# Patient Record
Sex: FEMALE | Race: WHITE
[De-identification: names, ages, dates, MRNs, and addresses within clinical notes are randomized per-mention and may not be internally consistent; named-entity substitution may affect disease eponyms.]

---

## 2017-01-25 ENCOUNTER — HOSPITAL ENCOUNTER (EMERGENCY)
Dept: HOSPITAL 74 - JERFT | Age: 37
Discharge: HOME | End: 2017-01-25
Payer: COMMERCIAL

## 2017-01-25 VITALS — BODY MASS INDEX: 22.6 KG/M2

## 2017-01-25 VITALS — TEMPERATURE: 97.9 F | HEART RATE: 75 BPM | SYSTOLIC BLOOD PRESSURE: 120 MMHG | DIASTOLIC BLOOD PRESSURE: 67 MMHG
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
## 2017-01-25 DIAGNOSIS — Z77.21: Primary | ICD-10-CM
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
## 2017-01-25 NOTE — PDOC
Post Exposure HPI





- General


Chief Complaint: Blood/Body Fluid Exposure SJR


Stated Complaint: STUCK BY NEEDLE


Time Seen by Provider: 01/25/17 16:53


History Source: Patient


Exam Limitations: No Limitations





- History of Present Illness


Initial Comments: 





01/25/17 17:25


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 

of needle that had been used, #10 are with full and will bounce back impaling 

her left index finger proximal digit. Patient states cleaned with alcohol and 

soap and water, 


Timing: this morning





Past History





- Travel


Traveled outside of the country in the last 30 days: Yes


Close contact w/someone who was outside of country & ill: Yes





- Past Medical History


Allergies/Adverse Reactions: 


Allergies





Penicillins Allergy (Verified 01/25/17 16:10)


 








Home Medications: 


Ambulatory Orders





NK [No Known Home Medication]  01/25/17 








General: Yes: no pertinent history





- Social History


Smoking Status: Never smoked





**Review of Systems





- Review of Systems


Able to Perform ROS?: Yes


Is the patient limited English proficient: Yes


Constitutional: Yes: Symptoms Reported, See HPI, Chills, Malaise


HEENTM: Yes: Symptoms Reported


Respiratory: Yes: See HPI, Cough.  No: Symptoms reported


ABD/GI: No: Symptoms Reported


Integumentary: Yes: Symptoms Reported, See HPI


Neurological: No: Symptoms reported


All Other Systems: Reviewed and Negative





*Physical Exam





- Vital Signs


 Last Vital Signs











Temp Pulse Resp BP Pulse Ox


 


 97.9 F   75   20   120/67   100 


 


 01/25/17 16:08  01/25/17 16:08  01/25/17 16:08  01/25/17 16:08  01/25/17 16:08














- Physical Exam


General Appearance: Yes: Nourished, Appropriately Dressed.  No: Apparent 

Distress


HEENT: positive: JEROME, Normal ENT Inspection, TMs Normal, Pharynx Normal


Neck: positive: Supple


Respiratory/Chest: positive: Lungs Clear, Normal Breath Sounds


Cardiovascular: positive: Regular Rate


Gastrointestinal/Abdominal: positive: Soft


Extremity: positive: Normal Capillary Refill, Normal Inspection, Normal Range 

of Motion, Tender (puncture wound noted to the proximal phalanx of left index 

finger, dorsum. full Motion, no active bleeding)


Integumentary: positive: Normal Color, Dry


Neurologic: positive: CNs II-XII NML intact, Fully Oriented, Alert, Normal Mood/

Affect, Normal Response, Motor Strength 5/5





Post Exposure - ED Protocol





- Exposure Treatment


Washing/Decontamination: Soap/Water


Source Patient HIV Status:: Unknown


Is PEP indicated?: Yes


Prophylaxis for HIV discussed?: Yes


Prophylaxis given?: No


Prophylaxis refused?: Yes





Progress Note





- Progress Note


Progress Note: 


Needle stick injury at podiatrist office. Reviewed implications of needlestick 

and body fluid exposure including hepatitis B, hepatitis C, and HIV disease. 

Patient understands multiple labs would be drawn, there is a waiting period for 

results of these tests including HIV and patient states she would prefer to be 

seen at her private physician's. 





*DC/Admit/Observation/Transfer


Diagnosis at time of Disposition: 


 Exposure to blood or body fluid





- Discharge Dispostion


Disposition: HOME


Condition at time of disposition: Stable


Admit: No





- Referrals





- Patient Instructions


Additional Instructions: 


Consider testing CBC, chemistries, liver enzymes, hepatitis B and C titers, HIV

, urinalysis and urine pregnancy testing baseline evaluation. 


Check hepatitis B, and tetanus/diphtheria/pertussis Vaccination record


Antiretrovirals would be a consideration for HIV prophylaxis





- Post Discharge Activity


Work/School Note:  Back to Work

## 2017-02-02 ENCOUNTER — APPOINTMENT (OUTPATIENT)
Dept: PULMONOLOGY | Facility: CLINIC | Age: 37
End: 2017-02-02

## 2017-02-03 PROBLEM — Z00.00 ENCOUNTER FOR PREVENTIVE HEALTH EXAMINATION: Status: ACTIVE | Noted: 2017-02-03

## 2017-02-16 ENCOUNTER — APPOINTMENT (OUTPATIENT)
Dept: PULMONOLOGY | Facility: CLINIC | Age: 37
End: 2017-02-16